# Patient Record
Sex: FEMALE | Race: OTHER | HISPANIC OR LATINO | URBAN - METROPOLITAN AREA
[De-identification: names, ages, dates, MRNs, and addresses within clinical notes are randomized per-mention and may not be internally consistent; named-entity substitution may affect disease eponyms.]

---

## 2020-03-12 ENCOUNTER — OUTPATIENT (OUTPATIENT)
Dept: OUTPATIENT SERVICES | Facility: HOSPITAL | Age: 45
LOS: 1 days | End: 2020-03-12
Payer: MEDICAID

## 2020-03-12 VITALS
RESPIRATION RATE: 18 BRPM | TEMPERATURE: 98 F | HEART RATE: 77 BPM | DIASTOLIC BLOOD PRESSURE: 72 MMHG | WEIGHT: 132.06 LBS | HEIGHT: 61 IN | SYSTOLIC BLOOD PRESSURE: 106 MMHG | OXYGEN SATURATION: 99 %

## 2020-03-12 DIAGNOSIS — N39.3 STRESS INCONTINENCE (FEMALE) (MALE): ICD-10-CM

## 2020-03-12 DIAGNOSIS — Z01.818 ENCOUNTER FOR OTHER PREPROCEDURAL EXAMINATION: ICD-10-CM

## 2020-03-12 DIAGNOSIS — Z98.51 TUBAL LIGATION STATUS: Chronic | ICD-10-CM

## 2020-03-12 DIAGNOSIS — J30.2 OTHER SEASONAL ALLERGIC RHINITIS: ICD-10-CM

## 2020-03-12 DIAGNOSIS — Z98.890 OTHER SPECIFIED POSTPROCEDURAL STATES: Chronic | ICD-10-CM

## 2020-03-12 DIAGNOSIS — N81.11 CYSTOCELE, MIDLINE: ICD-10-CM

## 2020-03-12 DIAGNOSIS — K21.9 GASTRO-ESOPHAGEAL REFLUX DISEASE WITHOUT ESOPHAGITIS: ICD-10-CM

## 2020-03-12 LAB
BLD GP AB SCN SERPL QL: SIGNIFICANT CHANGE UP
HCG SERPL-ACNC: 5 MIU/ML — HIGH

## 2020-03-12 PROCEDURE — G0463: CPT

## 2020-03-12 RX ORDER — FLUTICASONE PROPIONATE 50 MCG
1 SPRAY, SUSPENSION NASAL
Qty: 0 | Refills: 0 | DISCHARGE

## 2020-03-12 RX ORDER — OMEPRAZOLE 10 MG/1
1 CAPSULE, DELAYED RELEASE ORAL
Qty: 0 | Refills: 0 | DISCHARGE

## 2020-03-12 NOTE — H&P PST ADULT - NEGATIVE ALLERGY TYPES
no reactions to insect bites/no reactions to medicines/no indoor environmental allergies/no reactions to animals

## 2020-03-12 NOTE — H&P PST ADULT - ASSESSMENT
45 yr old female with PMH of seasonal allergies, GERD presents with stress and urge incontinence. Pt is scheduled for anterior and posterior colporrhaphy, transobturator tape sling procedure, cystoscopy on 03/16/2020.

## 2020-03-12 NOTE — H&P PST ADULT - NSICDXPASTSURGICALHX_GEN_ALL_CORE_FT
PAST SURGICAL HISTORY:  History of abdominoplasty tummy tuck    History of bilateral tubal ligation     History of laminectomy cervical PAST SURGICAL HISTORY:  History of abdominoplasty tummy tuck    History of augmentation of both breasts with silicone    History of bilateral tubal ligation     History of laminectomy cervical

## 2020-03-12 NOTE — H&P PST ADULT - NSICDXPROBLEM_GEN_ALL_CORE_FT
PROBLEM DIAGNOSES  Problem: Stress incontinence  Assessment and Plan: Anterior and posterior colporrhaphy, transobturator tape sling procedure, cystoscopy on 03/16/2020. Preoperative instructions discussed with pt and given to pt. Verbalized understanding of instructions given.     Problem: Seasonal allergies  Assessment and Plan: Instructed to continue Flonase as needed and to follow-up with PCP for allergy management.     Problem: GERD (gastroesophageal reflux disease)  Assessment and Plan: Instructed to continue Omeprazole as needed and take with sips of water on day of surgery. Follow-up with provider postop for management.

## 2020-03-12 NOTE — H&P PST ADULT - NEGATIVE NEUROLOGICAL SYMPTOMS
no weakness/no generalized seizures/no paresthesias/no tremors/no focal seizures/no syncope/no vertigo

## 2020-03-12 NOTE — H&P PST ADULT - NSICDXPASTMEDICALHX_GEN_ALL_CORE_FT
PAST MEDICAL HISTORY:  Cystocele, midline     GERD (gastroesophageal reflux disease)     Rectocele     Seasonal allergies     Stress incontinence

## 2020-03-12 NOTE — H&P PST ADULT - NEGATIVE CARDIOVASCULAR SYMPTOMS
no peripheral edema/no palpitations/no chest pain/no orthopnea/no claudication/no dyspnea on exertion

## 2020-03-12 NOTE — H&P PST ADULT - GASTROINTESTINAL DETAILS
normal/no masses palpable/bowel sounds normal/no bruit/no rebound tenderness/soft/nontender/no distention

## 2020-03-12 NOTE — H&P PST ADULT - ENDOCRINE
Protocol For Photochemotherapy: Baby Oil And Nbuvb: The patient received Photochemotherapy: Baby Oil and NBUVB (baby oil applied to all lesions prior to phototherapy). negative

## 2020-03-12 NOTE — H&P PST ADULT - HISTORY OF PRESENT ILLNESS
45 yr old female with PMH of seasonal allergies, GERD presents with c/o stress and urge incontinence. Pt for anterior and posterior colporrhaphy, transobturator tape sling procedure, cystoscopy on 03/16/2020.

## 2020-03-12 NOTE — H&P PST ADULT - RS GEN PE MLT RESP DETAILS PC
normal/breath sounds equal/no subcutaneous emphysema/clear to auscultation bilaterally/no wheezes/good air movement/no rhonchi/airway patent/no rales/no chest wall tenderness/no intercostal retractions/respirations non-labored

## 2020-03-12 NOTE — H&P PST ADULT - NEGATIVE GASTROINTESTINAL SYMPTOMS
no diarrhea/no change in bowel habits/no flatulence/no vomiting/no constipation/no nausea/no abdominal pain/no melena

## 2020-03-12 NOTE — H&P PST ADULT - GENERAL GENITOURINARY SYMPTOMS
incontinence/stress and urge incontinence/increased urinary frequency increased urinary frequency/incontinence/c/o stress and urge incontinence

## 2020-03-12 NOTE — H&P PST ADULT - NSANTHOSAYNRD_GEN_A_CORE
No. FABIANA screening performed.  STOP BANG Legend: 0-2 = LOW Risk; 3-4 = INTERMEDIATE Risk; 5-8 = HIGH Risk